# Patient Record
Sex: MALE | Race: WHITE | Employment: OTHER | URBAN - METROPOLITAN AREA
[De-identification: names, ages, dates, MRNs, and addresses within clinical notes are randomized per-mention and may not be internally consistent; named-entity substitution may affect disease eponyms.]

---

## 2020-01-21 ENCOUNTER — HOSPITAL ENCOUNTER (OUTPATIENT)
Dept: LAB | Age: 65
Discharge: HOME | End: 2020-01-21

## 2020-01-21 PROCEDURE — 88312 SPECIAL STAINS GROUP 1: CPT

## 2020-01-21 PROCEDURE — 88305 TISSUE EXAM BY PATHOLOGIST: CPT

## 2021-05-10 ENCOUNTER — TRANSCRIBE ORDER (OUTPATIENT)
Dept: REGISTRATION | Age: 66
End: 2021-05-10

## 2021-05-10 ENCOUNTER — HOSPITAL ENCOUNTER (OUTPATIENT)
Dept: LAB | Age: 66
Discharge: HOME | End: 2021-05-10
Payer: MEDICARE

## 2021-05-10 DIAGNOSIS — S60.221A CONTUSION OF RIGHT HAND: ICD-10-CM

## 2021-05-10 DIAGNOSIS — M19.042 ARTHRITIS OF HAND, LEFT: ICD-10-CM

## 2021-05-10 DIAGNOSIS — M19.041 ARTHRITIS OF RIGHT HAND: ICD-10-CM

## 2021-05-10 DIAGNOSIS — M79.642 LEFT HAND PAIN: ICD-10-CM

## 2021-05-10 DIAGNOSIS — M19.041 ARTHRITIS OF RIGHT HAND: Primary | ICD-10-CM

## 2021-05-10 DIAGNOSIS — M79.641 RIGHT HAND PAIN: ICD-10-CM

## 2021-05-10 LAB
ALBUMIN SERPL-MCNC: 3.8 G/DL (ref 3.2–4.6)
ALBUMIN/GLOB SERPL: 1.2 {RATIO} (ref 1.2–3.5)
ALP SERPL-CCNC: 67 U/L (ref 50–136)
ALT SERPL-CCNC: 41 U/L (ref 12–65)
ANION GAP SERPL CALC-SCNC: 4 MMOL/L (ref 7–16)
AST SERPL-CCNC: 27 U/L (ref 15–37)
BASOPHILS # BLD: 0 K/UL (ref 0–0.2)
BASOPHILS NFR BLD: 1 % (ref 0–2)
BILIRUB SERPL-MCNC: 0.8 MG/DL (ref 0.2–1.1)
BUN SERPL-MCNC: 15 MG/DL (ref 8–23)
CALCIUM SERPL-MCNC: 8.8 MG/DL (ref 8.3–10.4)
CHLORIDE SERPL-SCNC: 111 MMOL/L (ref 98–107)
CO2 SERPL-SCNC: 27 MMOL/L (ref 21–32)
CREAT SERPL-MCNC: 0.83 MG/DL (ref 0.8–1.5)
CRP SERPL-MCNC: <0.3 MG/DL (ref 0–0.9)
DIFFERENTIAL METHOD BLD: ABNORMAL
EOSINOPHIL # BLD: 0.2 K/UL (ref 0–0.8)
EOSINOPHIL NFR BLD: 4 % (ref 0.5–7.8)
ERYTHROCYTE [DISTWIDTH] IN BLOOD BY AUTOMATED COUNT: 11.7 % (ref 11.9–14.6)
ERYTHROCYTE [SEDIMENTATION RATE] IN BLOOD: 4 MM/HR (ref 0–20)
GLOBULIN SER CALC-MCNC: 3.1 G/DL (ref 2.3–3.5)
GLUCOSE SERPL-MCNC: 96 MG/DL (ref 65–100)
HCT VFR BLD AUTO: 49.8 % (ref 41.1–50.3)
HGB BLD-MCNC: 16.9 G/DL (ref 13.6–17.2)
IMM GRANULOCYTES # BLD AUTO: 0 K/UL (ref 0–0.5)
IMM GRANULOCYTES NFR BLD AUTO: 0 % (ref 0–5)
LYMPHOCYTES # BLD: 1.3 K/UL (ref 0.5–4.6)
LYMPHOCYTES NFR BLD: 20 % (ref 13–44)
MCH RBC QN AUTO: 31.9 PG (ref 26.1–32.9)
MCHC RBC AUTO-ENTMCNC: 33.9 G/DL (ref 31.4–35)
MCV RBC AUTO: 94.1 FL (ref 79.6–97.8)
MONOCYTES # BLD: 0.4 K/UL (ref 0.1–1.3)
MONOCYTES NFR BLD: 6 % (ref 4–12)
NEUTS SEG # BLD: 4.5 K/UL (ref 1.7–8.2)
NEUTS SEG NFR BLD: 70 % (ref 43–78)
NRBC # BLD: 0 K/UL (ref 0–0.2)
PLATELET # BLD AUTO: 157 K/UL (ref 150–450)
PMV BLD AUTO: 11.1 FL (ref 9.4–12.3)
POTASSIUM SERPL-SCNC: 3.6 MMOL/L (ref 3.5–5.1)
PROT SERPL-MCNC: 6.9 G/DL (ref 6.3–8.2)
RBC # BLD AUTO: 5.29 M/UL (ref 4.23–5.6)
SODIUM SERPL-SCNC: 142 MMOL/L (ref 138–145)
URATE SERPL-MCNC: 5.5 MG/DL (ref 2.6–6)
WBC # BLD AUTO: 6.5 K/UL (ref 4.3–11.1)

## 2021-05-10 PROCEDURE — 80053 COMPREHEN METABOLIC PANEL: CPT

## 2021-05-10 PROCEDURE — 85025 COMPLETE CBC W/AUTO DIFF WBC: CPT

## 2021-05-10 PROCEDURE — 86235 NUCLEAR ANTIGEN ANTIBODY: CPT

## 2021-05-10 PROCEDURE — 86140 C-REACTIVE PROTEIN: CPT

## 2021-05-10 PROCEDURE — 86200 CCP ANTIBODY: CPT

## 2021-05-10 PROCEDURE — 86038 ANTINUCLEAR ANTIBODIES: CPT

## 2021-05-10 PROCEDURE — 86430 RHEUMATOID FACTOR TEST QUAL: CPT

## 2021-05-10 PROCEDURE — 36415 COLL VENOUS BLD VENIPUNCTURE: CPT

## 2021-05-10 PROCEDURE — 84550 ASSAY OF BLOOD/URIC ACID: CPT

## 2021-05-10 PROCEDURE — 85652 RBC SED RATE AUTOMATED: CPT

## 2021-05-11 LAB
ANA SER QL: POSITIVE
CCP IGA+IGG SERPL IA-ACNC: 6 UNITS (ref 0–19)
CENTROMERE B AB SER-ACNC: <0.2 AI (ref 0–0.9)
CHROMATIN AB SERPL-ACNC: 0.4 AI (ref 0–0.9)
DSDNA AB SER-ACNC: 181 IU/ML (ref 0–9)
ENA JO1 AB SER-ACNC: <0.2 AI (ref 0–0.9)
ENA RNP AB SER-ACNC: 0.3 AI (ref 0–0.9)
ENA RNP AB SER-ACNC: 0.3 AI (ref 0–0.9)
ENA SCL70 AB SER-ACNC: <0.2 AI (ref 0–0.9)
ENA SM AB SER-ACNC: <0.2 AI (ref 0–0.9)
ENA SS-A AB SER-ACNC: <0.2 AI (ref 0–0.9)
ENA SS-A AB SER-ACNC: <0.2 AI (ref 0–0.9)
ENA SS-B AB SER-ACNC: <0.2 AI (ref 0–0.9)
ENA SS-B AB SER-ACNC: <0.2 AI (ref 0–0.9)
SEE BELOW, 164869: ABNORMAL

## 2021-05-12 LAB — RHEUMATOID FACT SER QL LA: NEGATIVE

## 2021-05-13 LAB — HLA-B27 QL NAA+PROBE: POSITIVE

## 2022-06-03 ENCOUNTER — OFFICE VISIT (OUTPATIENT)
Dept: ORTHOPEDIC SURGERY | Age: 67
End: 2022-06-03
Payer: MEDICARE

## 2022-06-03 VITALS — WEIGHT: 192.2 LBS | HEIGHT: 70 IN | BODY MASS INDEX: 27.52 KG/M2

## 2022-06-03 DIAGNOSIS — S22.070A COMPRESSION FRACTURE OF T9 VERTEBRA, INITIAL ENCOUNTER (HCC): Primary | ICD-10-CM

## 2022-06-03 DIAGNOSIS — R20.2 BILATERAL LEG PARESTHESIA: ICD-10-CM

## 2022-06-03 PROCEDURE — 1036F TOBACCO NON-USER: CPT | Performed by: NURSE PRACTITIONER

## 2022-06-03 PROCEDURE — 1123F ACP DISCUSS/DSCN MKR DOCD: CPT | Performed by: NURSE PRACTITIONER

## 2022-06-03 PROCEDURE — G8427 DOCREV CUR MEDS BY ELIG CLIN: HCPCS | Performed by: NURSE PRACTITIONER

## 2022-06-03 PROCEDURE — G8417 CALC BMI ABV UP PARAM F/U: HCPCS | Performed by: NURSE PRACTITIONER

## 2022-06-03 PROCEDURE — 3017F COLORECTAL CA SCREEN DOC REV: CPT | Performed by: NURSE PRACTITIONER

## 2022-06-03 PROCEDURE — 99204 OFFICE O/P NEW MOD 45 MIN: CPT | Performed by: NURSE PRACTITIONER

## 2022-06-03 RX ORDER — NITROGLYCERIN 0.4 MG/1
0.4 TABLET SUBLINGUAL EVERY 5 MIN PRN
COMMUNITY
Start: 2018-02-05 | End: 2022-07-05

## 2022-06-03 RX ORDER — CYCLOBENZAPRINE HCL 10 MG
10 TABLET ORAL 3 TIMES DAILY
COMMUNITY
Start: 2022-05-30 | End: 2022-07-05

## 2022-06-03 RX ORDER — METHYLPREDNISOLONE 4 MG/1
TABLET ORAL
Qty: 1 KIT | Refills: 0 | Status: SHIPPED | OUTPATIENT
Start: 2022-06-03 | End: 2022-07-05

## 2022-06-03 NOTE — PROGRESS NOTES
Name: Mauro Pinto  YOB: 1955  Gender: male  MRN: [id number]    CC: thoracic pain, numbness in legs     History of Present Illness: This is a very pleasant 78y.o. year old male who suffered a fall a few days ago. He fell down a hill. He fell falling forward almost onto his head but then snapped back into a hyper extension. He was treated in urgent care. He states that his morning pain is pretty significant. It is definitely elevated and difficult to get out of bed. But once he gets moving and takes some Advil it is much more tolerable. He had x-rays that were taken at  that revealed L4 compression fracture that was found on previous CT scan in 2019. There is also a T9 compression fracture noted but no previous images to compare to see if this was new or old. Radiologist felt that it may possibly be chronic. Patient tells me that he fractured L4 trying to water ski a few years ago. However at that time no one looked at his thoracic spine. When he had that injury in 2019 he did exceptionally well with physical therapy. He has noticed though over the past few days that he is getting some numbness and tingling in his legs. It is kind of diffuse in nature. He denies any changes or bladder or bowel function. No leg weakness. He is currently taking Tylenol and Advil and alternating. He has Tried Flexeril at night. This patient has not had lumbar surgery in the past. Thus far, the patient has tried NSAIDS, muscle relaxers and physical therapy          AMB PAIN ASSESSMENT 6/3/2022   Location of Pain Other (Comment)   Severity of Pain 3   Frequency of Pain Constant   Limiting Behavior Yes   Result of Injury No   Work-Related Injury No   Are there other pain locations you wish to document? No            ROS/Meds/PSH/PMH/FH/SH: I personally reviewed the patient's collected intake data.   Below are the pertinents:    No Known Allergies    Current Outpatient Medications:    cyclobenzaprine (FLEXERIL) 10 mg tablet, Take 10 mg by mouth 3 times daily, Disp: , Rfl:     nitroGLYCERIN (NITROSTAT) 0.4 MG SL tablet, Place 0.4 mg under the tongue every 5 minutes as needed, Disp: , Rfl:     methylPREDNISolone (MEDROL DOSEPACK) 4 MG tablet, Take by mouth as directed. Start in morning, Disp: 1 kit, Rfl: 0    aspirin 81 MG chewable tablet, Take 81 mg by mouth daily, Disp: , Rfl:     atenolol (TENORMIN) 50 MG tablet, Take 0.5 tablets by mouth daily, Disp: , Rfl:     pravastatin (PRAVACHOL) 80 MG tablet, Take 1 tablet by mouth daily, Disp: , Rfl:     omeprazole (PRILOSEC) 40 MG delayed release capsule, Take 40 mg by mouth daily (Patient not taking: Reported on 6/3/2022), Disp: , Rfl:   Patient Active Problem List   Diagnosis    Esophageal reflux    HTN (hypertension)    CAD (coronary artery disease)    Hyperlipemia       Tobacco:  reports that he has never smoked. He has never used smokeless tobacco.  Alcohol:   Social History     Substance and Sexual Activity   Alcohol Use No        Physical Exam:   BMI: Body mass index is 27.98 kg/m². GENERAL:  Adult in no acute distress, well developed, well nourished . Patient is appropriately conversant  MSK:  Examination of the thoracic and lumbar spine reveals paraspinal tenderness . There is mild tenderness to palpation along the spinous processes and paraspinal musculature. The patient ambulates with a normal gait. ROM of bilateral hip(s) reveals no irritability. NEURO:  Cranial nerves grossly intact. No motor deficits. Straight leg testing is negative  Sensory testing reveals intact sensation to light touch and in the distribution of the L3-S1 dermatomes bilaterally. Gonzalez's is negative. Upper extremity reflexes are a 2+. Ankle jerk is mildly positive for clonus on the left.   Babinski is negative    Reflexes   Right Left   Quadriceps (L4) 2 2   Achilles (S1) 2 2     Strength testing in the lower extremity reveals the following based on the 5 point grading scale:     HF (L2) H Ab (L5) KE (L3/4) ADF (L4) EHL (L5) A Ev (S1) APF (S1)   Right 5 5 5 5 5 5 5   Left 5 5 5 5 5 5 5     PSYCH:  Alert and oriented X 3. Appropriate affect. Intact judgment and insight. Radiographic Studies:    AP and Lateral thoracic spine: Reveal thoracic kyphosis with a obvious compression fracture to T9. Assessment/Plan:       Diagnosis Orders   1. Compression fracture of T9 vertebra, initial encounter ([facility] [address]  MRI THORACIC SPINE WO CONTRAST    Amb External Referral To Physical Therapy   2. Bilateral leg paresthesia  MRI THORACIC SPINE WO CONTRAST    Amb External Referral To Physical Therapy         This patient's clinical history and physical exam is consistent with a compression fracture of T9. However I am unclear if this is chronic or acute. His pain profile does not seem to fit with a acute compression fracture. He possibly could have had this fracture back in his 2019 injury and nobody picked up on it. My concern is the paresthesias in his lower extremities and possible clonus on the left. I discussed this with him and told him he has kyphosis had a hyperextension injury therefore I would like to order an MRI of the thoracic spine just to check his spinal cord in the thoracic region to make sure we do not have any kind of injury also to decipher if this is an acute or chronic fracture. If it is a chronic fracture and everything looks good he can go ahead and proceed with physical therapy and just continue conservative treatment. I will give him a Medrol Dosepak just for acute inflammation. . We went over the treatment options as follows. I told him that the natural history of this condition is slow resolution of symptoms over a several month period. He  could opt to treat this with symptomatic care including pain management and/or could also try a brace.   With either of these measures, the fracture would be expected to heal in its current position without restoration of height or deformity. Alternatively, he  could consider a kyphoplasty. The benefit of the kyphoplasty is that fracture pain would subside almost immediately, and there is a good possibility of partial restoration of the vertebral body shape. The downside is the fact that he has to undergo surgery, and the mismatch of the hardness of the cement with the hardness of the osteoporotic adjacent vertebrae. This could theoretically predispose an adjacent level fracture. - Physical Therapy was prescribed and will include stretching, strengthening and modalities to promote blood flow, flexibility and core strengthening.  - Oral Steroids: A short course of oral steroids was prescribed in an attempt to bring the acute radicular symptoms under control. The patient understands the risks and side effects of oral steroids including immunosuppression, hypertension, mood swings, increased blood sugar, including glaucoma. The steroid taper can be followed by NSAIDs once completed. - A MRI was ordered to delineate anatomy, confirm the diagnosis and assess the severity. 4 This is a undiagnosed new problem with uncertain prognosis    Orders Placed This Encounter   Medications    methylPREDNISolone (MEDROL DOSEPACK) 4 MG tablet     Sig: Take by mouth as directed. Start in morning     Dispense:  1 kit     Refill:  0        Orders Placed This Encounter   Procedures    MRI THORACIC SPINE WO CONTRAST    Amb External Referral To Physical Therapy              No follow-ups on file. CHRISTIANO Iqbal - CNP  06/03/22      Elements of this note were created using speech recognition software. As such, errors of speech recognition may be present.

## 2022-06-05 DIAGNOSIS — R20.2 BILATERAL LEG PARESTHESIA: ICD-10-CM

## 2022-06-05 DIAGNOSIS — S22.070A COMPRESSION FRACTURE OF T9 VERTEBRA, INITIAL ENCOUNTER (HCC): ICD-10-CM

## 2022-06-06 ENCOUNTER — TELEPHONE (OUTPATIENT)
Dept: ORTHOPEDIC SURGERY | Age: 67
End: 2022-06-06

## 2022-06-06 NOTE — TELEPHONE ENCOUNTER
----- Message from Kadie Villarreal MA sent at 6/3/2022  5:25 PM EDT -----  Regarding: call patient on monday regarding mri

## 2022-06-06 NOTE — TELEPHONE ENCOUNTER
6/3/22-patient called with MRI results. MRI actually revealed an acute compression fracture at T9. Discussed this with patient. He denied the need for brace. We discussed lifting restrictions and activity restrictions. He will hold off on utilizing the steroids and alternate Tylenol and ibuprofen. I also told him to wait 2 to 3 weeks before starting physical therapy. This would explain patient's numbness and tingling in his legs. Do not see any signs of myelopathy within his cord. At this point he can follow-up in 6 weeks or as needed. Discussed that it can take up to 3 months to heal from a compression fracture. He has had a history of 1 in the past due to a traumatic fall.

## 2022-07-05 ENCOUNTER — OFFICE VISIT (OUTPATIENT)
Dept: FAMILY MEDICINE CLINIC | Facility: CLINIC | Age: 67
End: 2022-07-05
Payer: MEDICARE

## 2022-07-05 VITALS
OXYGEN SATURATION: 97 % | HEIGHT: 69 IN | HEART RATE: 97 BPM | SYSTOLIC BLOOD PRESSURE: 140 MMHG | BODY MASS INDEX: 28.73 KG/M2 | WEIGHT: 194 LBS | TEMPERATURE: 97 F | DIASTOLIC BLOOD PRESSURE: 86 MMHG

## 2022-07-05 DIAGNOSIS — Z80.42 FAMILY HISTORY OF PROSTATE CANCER IN FATHER: ICD-10-CM

## 2022-07-05 DIAGNOSIS — Z12.5 PROSTATE CANCER SCREENING: Primary | ICD-10-CM

## 2022-07-05 PROCEDURE — G8417 CALC BMI ABV UP PARAM F/U: HCPCS | Performed by: STUDENT IN AN ORGANIZED HEALTH CARE EDUCATION/TRAINING PROGRAM

## 2022-07-05 PROCEDURE — 3017F COLORECTAL CA SCREEN DOC REV: CPT | Performed by: STUDENT IN AN ORGANIZED HEALTH CARE EDUCATION/TRAINING PROGRAM

## 2022-07-05 PROCEDURE — G8427 DOCREV CUR MEDS BY ELIG CLIN: HCPCS | Performed by: STUDENT IN AN ORGANIZED HEALTH CARE EDUCATION/TRAINING PROGRAM

## 2022-07-05 PROCEDURE — 1123F ACP DISCUSS/DSCN MKR DOCD: CPT | Performed by: STUDENT IN AN ORGANIZED HEALTH CARE EDUCATION/TRAINING PROGRAM

## 2022-07-05 PROCEDURE — 99202 OFFICE O/P NEW SF 15 MIN: CPT | Performed by: STUDENT IN AN ORGANIZED HEALTH CARE EDUCATION/TRAINING PROGRAM

## 2022-07-05 PROCEDURE — 1036F TOBACCO NON-USER: CPT | Performed by: STUDENT IN AN ORGANIZED HEALTH CARE EDUCATION/TRAINING PROGRAM

## 2022-07-05 ASSESSMENT — PATIENT HEALTH QUESTIONNAIRE - PHQ9
SUM OF ALL RESPONSES TO PHQ QUESTIONS 1-9: 0
2. FEELING DOWN, DEPRESSED OR HOPELESS: 0
SUM OF ALL RESPONSES TO PHQ9 QUESTIONS 1 & 2: 0
1. LITTLE INTEREST OR PLEASURE IN DOING THINGS: 0
SUM OF ALL RESPONSES TO PHQ QUESTIONS 1-9: 0

## 2022-07-05 ASSESSMENT — ANXIETY QUESTIONNAIRES
1. FEELING NERVOUS, ANXIOUS, OR ON EDGE: 0
5. BEING SO RESTLESS THAT IT IS HARD TO SIT STILL: 0
IF YOU CHECKED OFF ANY PROBLEMS ON THIS QUESTIONNAIRE, HOW DIFFICULT HAVE THESE PROBLEMS MADE IT FOR YOU TO DO YOUR WORK, TAKE CARE OF THINGS AT HOME, OR GET ALONG WITH OTHER PEOPLE: NOT DIFFICULT AT ALL
GAD7 TOTAL SCORE: 0
6. BECOMING EASILY ANNOYED OR IRRITABLE: 0
2. NOT BEING ABLE TO STOP OR CONTROL WORRYING: 0
3. WORRYING TOO MUCH ABOUT DIFFERENT THINGS: 0
4. TROUBLE RELAXING: 0
7. FEELING AFRAID AS IF SOMETHING AWFUL MIGHT HAPPEN: 0

## 2022-07-05 NOTE — PROGRESS NOTES
[facility]  Scott Victor  Phone [phone]  Fax:  [phone]    Alyx Nieves (:  1955) is a 75 y.o. male here for evaluation of the following chief complaint(s):  Orders (pt wants a psa lab test -- no problems)       ASSESSMENT/PLAN:  1. Prostate cancer screening  -     PSA Screening; Future  2. Family history of prostate cancer in father  -     PSA Screening; Future    Patient requesting screening PSA. His father had prostate cancer. Patient's last PSA in  was 1.2. Will check PSA today. Colonoscopy 2020 with no polyps, 10-year repeat recommended. Return if symptoms worsen or fail to improve. Subjective   SUBJECTIVE/OBJECTIVE:  HPI   44-year-old male with PMH of HTN, CAD with stents, HLD, and compression fractures who presents for prostate cancer screening.  -Requesting screening PSA  -PSA was 1.2 in 2016  -dad had prostate cancer  -saw Rheum last week for evaluation of elevated SUZIE and DS-DNA, patient reports they weren't concerned  -had DEXA scan last week given h/o compression fracture x2  -CMP and lipid panel unremarkable 10/2021    Review of Systems       Objective     Vitals:    22 1413   BP: (!) 140/86   Pulse: 97   Temp: 97 °F (36.1 °C)   SpO2: 97%       Physical Exam  Vitals reviewed. Constitutional:       General: He is not in acute distress. HENT:      Head: Normocephalic and atraumatic. Cardiovascular:      Rate and Rhythm: Normal rate and regular rhythm. Pulmonary:      Effort: Pulmonary effort is normal.      Breath sounds: Normal breath sounds. Musculoskeletal:      Right lower leg: No edema. Left lower leg: No edema. Skin:     General: Skin is warm and dry. Neurological:      General: No focal deficit present. Mental Status: He is alert and oriented to person, place, and time. An electronic signature was used to authenticate this note.     --Tato Tamez MD

## 2022-07-06 LAB — PSA SERPL-MCNC: 1.6 NG/ML

## 2023-02-23 DIAGNOSIS — M54.50 ACUTE LOW BACK PAIN, UNSPECIFIED BACK PAIN LATERALITY, UNSPECIFIED WHETHER SCIATICA PRESENT: Primary | ICD-10-CM

## 2023-02-23 RX ORDER — METHYLPREDNISOLONE 4 MG/1
TABLET ORAL
Qty: 1 KIT | Refills: 0 | Status: SHIPPED | OUTPATIENT
Start: 2023-02-23

## 2023-02-23 NOTE — PROGRESS NOTES
Existing patient of mine that I previously treated for a T12 compression fracture who has called with elevated levels of left lower back pain. I will go ahead and send in a Medrol Dosepak form, we have sent in a physical therapy order in to [facility] and moved his appointment.

## 2023-03-01 ENCOUNTER — OFFICE VISIT (OUTPATIENT)
Dept: ORTHOPEDIC SURGERY | Age: 68
End: 2023-03-01

## 2023-03-01 DIAGNOSIS — M47.816 LUMBAR FACET ARTHROPATHY: ICD-10-CM

## 2023-03-01 DIAGNOSIS — M54.50 LOW BACK PAIN, UNSPECIFIED BACK PAIN LATERALITY, UNSPECIFIED CHRONICITY, UNSPECIFIED WHETHER SCIATICA PRESENT: Primary | ICD-10-CM

## 2023-03-01 RX ORDER — NITROGLYCERIN 0.4 MG/1
0.4 TABLET SUBLINGUAL EVERY 5 MIN PRN
COMMUNITY
Start: 2022-12-06

## 2023-03-01 NOTE — PROGRESS NOTES
Name: Myrna العلي  YOB: 1955  Gender: male  MRN: [id number]    CC: Left lower back pain    HPI: This is a 75y.o. year old male who I have treated in the past for a thoracic compression fracture after an injury. Patient now when he was tending to his peaches developed pain in the left lower back. It is worse with walking uphill. I did send in a round of steroids which was somewhat helpful. He would like to also go ahead and start physical therapy. He has tried in the past Tylenol and Advil and Flexeril. The pain sometimes can be shooting other times it can just be dull and tolerable. He does not have any radiating leg pain. Its in the lowest level at L5-S1 rating slightly into the buttock. This patient  has not had lumbar surgery in the past.     Thus far, the patient has tried : Steroids, over-the-counter medication         AMB PAIN ASSESSMENT 6/3/2022   Location of Pain Other (Comment)   Severity of Pain 3   Frequency of Pain Constant   Limiting Behavior Yes   Result of Injury No   Work-Related Injury No   Are there other pain locations you wish to document? No            ROS/Meds/PSH/PMH/FH/SH: I personally reviewed the patient's collected intake data. Below are the pertinents:    No Known Allergies      Current Outpatient Medications:     nitroGLYCERIN (NITROSTAT) 0.4 MG SL tablet, Place 0.4 mg under the tongue every 5 minutes as needed, Disp: , Rfl:     aspirin 81 MG chewable tablet, Take 81 mg by mouth daily, Disp: , Rfl:     atenolol (TENORMIN) 50 MG tablet, Take 0.5 tablets by mouth daily, Disp: , Rfl:     pravastatin (PRAVACHOL) 80 MG tablet, Take 1 tablet by mouth daily, Disp: , Rfl:     methylPREDNISolone (MEDROL DOSEPACK) 4 MG tablet, Take by mouth as directed. Start in morning (Patient not taking: Reported on 3/1/2023), Disp: 1 kit, Rfl: 0    Past Surgical History:   Procedure Laterality Date    CAROTID STENT PLACEMENT  2018    COLONOSCOPY  2009    Dr. Chastity Najera.   neg [place] CARDIAC SURG PROCEDURE UNLIST  2005; 2015    stents. Dr. Eli García, [facility]    UPPER GASTROINTESTINAL ENDOSCOPY  2009    edema and narrowing;  no rodriguez's. dilated. Dr. Renetta Richmond       Patient Active Problem List   Diagnosis    Esophageal reflux    HTN (hypertension)    CAD (coronary artery disease)    Hyperlipemia         Tobacco:  reports that he has never smoked. He has never used smokeless tobacco.  Alcohol:   Social History     Substance and Sexual Activity   Alcohol Use No        Physical Exam:   There is no height or weight on file to calculate BMI. GENERAL:  Adult in no acute distress, well developed, well nourished Patient is appropriately conversant  MSK:  Examination of the lumbar spine reveals paraspinal tenderness , facet tenderness   There is moderate tenderness to palpation along the spinous processes and paraspinal musculature. The patient ambulates with a normal gait. ROM of bilateral hip(s) reveals no irritability. NEURO:  Cranial nerves grossly intact. No motor deficits. Straight leg testing is negative bilateral  Sensory testing reveals intact sensation to light touch and in the distribution of the L3-S1 dermatomes bilaterally  Ankle jerk is negative for clonus    Reflexes   Right Left   Quadriceps (L4) 2 2   Achilles (S1) 2 2     Strength testing in the lower extremity reveals the following based on the 5 point grading scale:     HF (L2) H Ab (L5) KE (L3/4) ADF (L4) EHL (L5) A Ev (S1) APF (S1)   Right 5 5 5 5 5 5 5   Left 5 5 5 5 5 5 5     PSYCH:  Alert and oriented X 3. Appropriate affect. Intact judgment and insight. Radiographic Studies:     AP, lateral and spot views of the lumbar spine:      Old compression fracture noted at L4. Lower lumbar facet arthropathy noted. No fractures or lesions that are new are identified. Interpretation: Lower lumbar spondylosis        Assessment/Plan:       Diagnosis Orders   1.  Low back pain, unspecified back pain laterality, unspecified chronicity, unspecified whether sciatica present  XR LUMBAR SPINE (2-3 VIEWS)    MRI LUMBAR SPINE WO CONTRAST    Amb External Referral To Physical Therapy      2. Lumbar facet arthropathy  MRI LUMBAR SPINE WO CONTRAST    Amb External Referral To Physical Therapy          This patient's clinical history and physical exam is consistent with spondylitic  and facetogenic back pain. I discussed with him the natural history of this condition in that most episodes are typically self limited. However, the symptoms can last for several months if not even longer. What I currently recommend is that he continues with conservative treatments to help cope with the symptoms and avoid having back surgery at this time. He understands that conservative treatments typically include activity modification, NSAIDs and physical therapy. Oral and/or epidural steroids could be considered in resistant scenarios. Also, he  may want to explore chiropractic care or acupuncture. I advised to avoid any prolonged bedrest and to try to maintain ADLs as much as possible. The patient was counseled to follow up me should he  develop any neurologic symptoms such as leg pain. - Physical Therapy was prescribed and will include stretching, strengthening and modalities to promote blood flow, flexibility and core strengthening.  - A MRI was ordered to delineate anatomy, confirm the diagnosis and assess the severity. No orders of the defined types were placed in this encounter. Orders Placed This Encounter   Procedures    XR LUMBAR SPINE (2-3 VIEWS)    MRI LUMBAR SPINE WO CONTRAST    Amb External Referral To Physical Therapy        3 This is an acute, uncomplicated illness/injury      Return for MRI results. CHRISTIANO Rod - CNP  03/01/23      Elements of this note were created using speech recognition software. As such, errors of speech recognition may be present.

## 2023-03-27 ENCOUNTER — OFFICE VISIT (OUTPATIENT)
Dept: ORTHOPEDIC SURGERY | Age: 68
End: 2023-03-27
Payer: MEDICARE

## 2023-03-27 DIAGNOSIS — M47.816 LUMBAR FACET ARTHROPATHY: Primary | ICD-10-CM

## 2023-03-27 DIAGNOSIS — M51.36 LUMBAR DEGENERATIVE DISC DISEASE: ICD-10-CM

## 2023-03-27 PROCEDURE — 1036F TOBACCO NON-USER: CPT | Performed by: NURSE PRACTITIONER

## 2023-03-27 PROCEDURE — G8484 FLU IMMUNIZE NO ADMIN: HCPCS | Performed by: NURSE PRACTITIONER

## 2023-03-27 PROCEDURE — 3017F COLORECTAL CA SCREEN DOC REV: CPT | Performed by: NURSE PRACTITIONER

## 2023-03-27 PROCEDURE — G8417 CALC BMI ABV UP PARAM F/U: HCPCS | Performed by: NURSE PRACTITIONER

## 2023-03-27 PROCEDURE — 99213 OFFICE O/P EST LOW 20 MIN: CPT | Performed by: NURSE PRACTITIONER

## 2023-03-27 PROCEDURE — G8428 CUR MEDS NOT DOCUMENT: HCPCS | Performed by: NURSE PRACTITIONER

## 2023-03-27 PROCEDURE — 1123F ACP DISCUSS/DSCN MKR DOCD: CPT | Performed by: NURSE PRACTITIONER

## 2023-03-27 NOTE — PROGRESS NOTES
subarticular recess effacement at L3-4 and  L4-5 with contact of the descending left L5 nerve root. No overall significant  spinal canal stenosis. Low-grade foraminal stenosis as above. Assessment/Plan:        ICD-10-CM    1. Lumbar facet arthropathy  M47.816       2. Lumbar degenerative disc disease  M51.36            I reviewed patient's MRI findings. We discussed that there is slight amount of left lateral recess narrowing at L4-5 with slight L4-5 facet arthropathy. Ultimately there is nothing that I would address surgically. He is really not interested in interventional management. I told him his MRI is very reassuring but definitely could be the causative factor of his intermittent leg pain. Therefore he is going to continue conservative treatment. We did discuss possibly trialing Tylenol with caffeine in it to see if this gives him a little bit better relief of aches and pains in the morning when he gets up.    -The patient will be treated observantly with self directed symptomatic care. Instructions were given to follow up if there is any neurologic or functional decline. - A home exercise program was prescribed for stretching and strengthening. A list of exercises was provided. No orders of the defined types were placed in this encounter. No orders of the defined types were placed in this encounter. 3 This is stable chronic illness/condition      Return if symptoms worsen or fail to improve. CHRISTIANO Cali - CNP  03/27/23      Elements of this note were created using speech recognition software. As such, errors of speech recognition may be present.